# Patient Record
Sex: FEMALE | Race: AMERICAN INDIAN OR ALASKA NATIVE
[De-identification: names, ages, dates, MRNs, and addresses within clinical notes are randomized per-mention and may not be internally consistent; named-entity substitution may affect disease eponyms.]

---

## 2021-06-20 ENCOUNTER — HOSPITAL ENCOUNTER (EMERGENCY)
Dept: HOSPITAL 5 - ED | Age: 81
Discharge: HOME | End: 2021-06-20
Payer: MEDICAID

## 2021-06-20 VITALS — DIASTOLIC BLOOD PRESSURE: 81 MMHG | SYSTOLIC BLOOD PRESSURE: 148 MMHG

## 2021-06-20 DIAGNOSIS — Z91.041: ICD-10-CM

## 2021-06-20 DIAGNOSIS — Z86.73: ICD-10-CM

## 2021-06-20 DIAGNOSIS — E11.9: ICD-10-CM

## 2021-06-20 DIAGNOSIS — I10: ICD-10-CM

## 2021-06-20 DIAGNOSIS — R51.9: Primary | ICD-10-CM

## 2021-06-20 DIAGNOSIS — Z79.899: ICD-10-CM

## 2021-06-20 DIAGNOSIS — Z88.0: ICD-10-CM

## 2021-06-20 LAB
APTT BLD: 41.1 SEC. (ref 24.2–36.6)
BASOPHILS # (AUTO): 0.1 K/MM3 (ref 0–0.1)
BUN SERPL-MCNC: 19 MG/DL (ref 7–17)
BUN/CREAT SERPL: 17 %
CALCIUM SERPL-MCNC: 9.6 MG/DL (ref 8.4–10.2)
EOSINOPHIL # BLD AUTO: 0.2 K/MM3 (ref 0–0.4)
EOSINOPHIL NFR BLD AUTO: 2.2 % (ref 0–4.3)
HCT VFR BLD CALC: 43.9 % (ref 30.3–42.9)
HEMOLYSIS INDEX: 5
HGB BLD-MCNC: 15 GM/DL (ref 10.1–14.3)
INR PPP: 1.05 (ref 0.87–1.13)
LYMPHOCYTES # BLD AUTO: 2 K/MM3 (ref 1.2–5.4)
LYMPHOCYTES NFR BLD AUTO: 26.7 % (ref 13.4–35)
MCHC RBC AUTO-ENTMCNC: 34 % (ref 30–34)
MCV RBC AUTO: 92 FL (ref 79–97)
MONOCYTES # (AUTO): 0.8 K/MM3 (ref 0–0.8)
MONOCYTES % (AUTO): 10.6 % (ref 0–7.3)
PLATELET # BLD: 186 K/MM3 (ref 140–440)
RBC # BLD AUTO: 4.75 M/MM3 (ref 3.65–5.03)

## 2021-06-20 PROCEDURE — 85670 THROMBIN TIME PLASMA: CPT

## 2021-06-20 PROCEDURE — 36415 COLL VENOUS BLD VENIPUNCTURE: CPT

## 2021-06-20 PROCEDURE — 70450 CT HEAD/BRAIN W/O DYE: CPT

## 2021-06-20 PROCEDURE — 85025 COMPLETE CBC W/AUTO DIFF WBC: CPT

## 2021-06-20 PROCEDURE — 85610 PROTHROMBIN TIME: CPT

## 2021-06-20 PROCEDURE — 85730 THROMBOPLASTIN TIME PARTIAL: CPT

## 2021-06-20 PROCEDURE — 80048 BASIC METABOLIC PNL TOTAL CA: CPT

## 2021-06-20 PROCEDURE — 93005 ELECTROCARDIOGRAM TRACING: CPT

## 2021-06-20 PROCEDURE — 84484 ASSAY OF TROPONIN QUANT: CPT

## 2021-06-20 NOTE — CAT SCAN REPORT
NONENHANCED CT SCAN OF THE HEAD: 



INDICATION / CLINICAL INFORMATION:

81 years Female; neuro deficits <6hrs or sx present upon awakening.



TECHNIQUE: Routine CT head without contrast. All CT scans at this location are performed using CT dos
e reduction for ALARA by means of automated exposure control. 



COMPARISON: 

None.



FINDINGS:



BRAIN / INTRACRANIAL CONTENTS: No acute hemorrhage, mass effect, midline shift, hydrocephalus, or acu
te, large territorial infarct. Subtle ischemic changes in the right corpus stratum; age indeterminate
 No significant white matter abnormality. 



CRANIOCERVICAL JUNCTION: No significant abnormality.



ORBITS: No significant abnormality of visualized orbits.



SINUSES / MASTOIDS: No significant abnormality of the visualized paranasal sinuses or mastoid air aleena
ls.



ADDITIONAL FINDINGS: None. 



IMPRESSION:

 No intracerebral hemorrhage or stroke mimics



No acute territorial infarction; focal ischemic changes in the right corpus stratum; age indeterminat
e



Signer Name: Imelda Workman MD 

Signed: 6/20/2021 5:27 PM

Workstation Name: [id number]

## 2021-06-20 NOTE — EMERGENCY DEPARTMENT REPORT
HPI





- General


Chief Complaint: Neuro Symptoms/Deficit


Time Seen by Provider: 21 18:31





- [facility]


HPI: 





Room 7








The patient is an 81-year-old female present with a chief complaint of headache.

 Family states the patient has been complaining of intermittent migrating 

headache since yesterday.  There is also noticed redness to the left eye.  Has 

been no nausea vomiting, fever or preceding trauma.  Patient denies paresthesia 

or weakness.  Patient currently gets her headache a score of 8/10.





ED Past Medical Hx





- Past Medical History


Hx Hypertension: Yes


Hx CVA: Yes (TIA x2)


Hx Diabetes: Yes





- Surgical History


Past Surgical History?: No





- Family History


Family history: no significant





- Social History


Smoking Status: Never Smoker


Substance Use Type: None





- Medications


Home Medications: 


                                Home Medications











 Medication  Instructions  Recorded  Confirmed  Last Taken  Type


 


HYDROcodone/APAP 5-325 [[place] 1 each PO Q6HR PRN #10 tablet 21  Unknown Rx





5/325]     














ED Review of Systems


ROS: 


Stated complaint: HEADACHE


Other details as noted in HPI





Constitutional: denies: fever


Eyes: other (Subconjunctival hemorrhage OS)


ENT: denies: ear pain


Respiratory: no symptoms reported


Cardiovascular: denies: chest pain


Endocrine: no symptoms reported


Gastrointestinal: denies: abdominal pain


Genitourinary: denies: dysuria


Musculoskeletal: denies: back pain


Neurological: headache.  denies: weakness, numbness, paresthesias





Physical Exam





- Physical Exam


Vital Signs: 


                                   Vital Signs











  21





  15:50


 


Temperature 98.7 F


 


Pulse Rate 76


 


Respiratory 20





Rate 


 


Blood Pressure 148/81


 


O2 Sat by Pulse 96





Oximetry 











Physical Exam: 





GENERAL: The patient is well-developed well-nourished female lying on stretcher 

not appearing to be in acute distress. []


HEENT: Normocephalic.  Atraumatic.  Extraocular motions are intact.  Patient has

 moist mucous membranes.  Subconjunctival hemorrhage OS.  No hyphema


NECK: Supple.  No meningitic signs are noted.  There is no nuchal rigidity


CHEST/LUNGS: Clear to auscultation.  There is no respiratory distress noted.


HEART/CARDIOVASCULAR: Regular.  There is no tachycardia.  There is no gallop rub

 or murmur.


ABDOMEN: Abdomen is soft, nontender.  Patient has normal bowel sounds.  There is

 no abdominal distention.


SKIN: There is no rash.  There is no edema.  There is no diaphoresis.


NEURO: The patient is awake, alert, and oriented.  The patient is cooperative.  

The patient has no focal neurologic deficits.  The patient has normal speech and

 gait.  Cranial nerves II through XII grossly intact, no drift.  GCS 15.  Negat

faustino Romberg


MUSCULOSKELETAL:  There is no evidence of acute injury.





ED Course


                                   Vital Signs











  21





  15:50


 


Temperature 98.7 F


 


Pulse Rate 76


 


Respiratory 20





Rate 


 


Blood Pressure 148/81


 


O2 Sat by Pulse 96





Oximetry 














ED Medical Decision Making





- Lab Data


Result diagrams: 


                                 21 16:14





                                 21 16:14





                                Laboratory Tests











  21





  16:14 16:14 16:14


 


WBC  7.5  


 


RBC  4.75  


 


Hgb  15.0 H  


 


Hct  43.9 H  


 


MCV  92  


 


MCH  32  


 


MCHC  34  


 


RDW  13.1 L  


 


Plt Count  186  


 


Lymph % (Auto)  26.7  


 


Mono % (Auto)  10.6 H  


 


Eos % (Auto)  2.2  


 


Baso % (Auto)  Np  


 


Lymph # (Auto)  2.0  


 


Mono # (Auto)  0.8  


 


Eos # (Auto)  0.2  


 


Baso # (Auto)  0.1  


 


Seg Neutrophils %  59.8  


 


Seg Neutrophils #  4.5  


 


PT   14.2 


 


INR   1.05 


 


APTT   41.1 H 


 


Thrombin Time   


 


Sodium    144


 


Potassium    3.8


 


Chloride    102.3


 


Carbon Dioxide    31 H


 


Anion Gap    15


 


BUN    19 H


 


Creatinine    1.1


 


Estimated GFR    58


 


BUN/Creatinine Ratio    17


 


Glucose    90


 


Calcium    9.6


 


Troponin T    < 0.010














  21





  16:14


 


WBC 


 


RBC 


 


Hgb 


 


Hct 


 


MCV 


 


MCH 


 


MCHC 


 


RDW 


 


Plt Count 


 


Lymph % (Auto) 


 


Mono % (Auto) 


 


Eos % (Auto) 


 


Baso % (Auto) 


 


Lymph # (Auto) 


 


Mono # (Auto) 


 


Eos # (Auto) 


 


Baso # (Auto) 


 


Seg Neutrophils % 


 


Seg Neutrophils # 


 


PT 


 


INR 


 


APTT 


 


Thrombin Time  16.0


 


Sodium 


 


Potassium 


 


Chloride 


 


Carbon Dioxide 


 


Anion Gap 


 


BUN 


 


Creatinine 


 


Estimated GFR 


 


BUN/Creatinine Ratio 


 


Glucose 


 


Calcium 


 


Troponin T 














- EKG Data


-: EKG Interpreted by Me


EKG shows normal: sinus rhythm


Rate: normal





- EKG Data


When compared to previous EKG there are: previous EKG unavailable


Interpretation: nonspecific ST-T wave hilary (T wave inversions in leads V5, V6)





- Radiology Data


Radiology results: report reviewed (CT head), image reviewed (CT head)





[facility] [address] [place] [address] Cat

 Scan Report Signed Patient: ODILON MEJIA MR#: [id number] : 

1940 Acct:[id number] Age/Sex: 81 / F ADM Date: 21 Loc: ED 

Attending Dr: Ordering Physician: AMANDA ANDREWS MD Date of Service: 21 

Procedure(s): CT head/brain wo con Accession Number(s): [id number] cc: AMANDA ANDREWS MD 

NONENHANCED CT SCAN OF THE HEAD: INDICATION / CLINICAL INFORMATION: 81 years 

Female; neuro deficits <6hrs or sx present upon awakening. TECHNIQUE: Routine CT

 head without contrast. All CT scans at this location are performed using CT 

dose reduction for ALARA by means of automated exposure control. COMPARISON: 

None. FINDINGS: BRAIN / INTRACRANIAL CONTENTS: No acute hemorrhage, mass effect,

 midline shift, hydrocephalus, or acute, large territorial infarct. Subtle 

ischemic changes in the right corpus stratum; age indeterminate No significant 

white matter abnormality. CRANIOCERVICAL JUNCTION: No significant abnormality. 

ORBITS: No significant abnormality of visualized orbits. SINUSES / MASTOIDS: No 

significant abnormality of the visualized paranasal sinuses or mastoid air 

cells. ADDITIONAL FINDINGS: None. IMPRESSION: No intracerebral hemorrhage or 

stroke mimics No acute territorial infarction; focal ischemic changes in the 

right corpus stratum; age indeterminate Signer Name: Imelda Workman MD Signed: 2021 5:27 PM Workstation Name: [id number] Transcribed By: BS 

Dictated By: Imelda Serra MD Electronically Authenticated By: 

Imelda Serra MD Signed Date/Time: 21 DD/DT: 21 

TD/TT: 


Print Cancel 











- Differential Diagnosis


Headache, ICH, intracranial mass, migraine


Critical care attestation.: 


If time is entered above; I have spent that time in minutes in the direct care 

of this critically ill patient, excluding procedure time.








ED Disposition


Clinical Impression: 


 Headache





Disposition: DC-01 TO HOME OR SELFCARE


Is pt being admited?: No


Does the pt Need Aspirin: No


Condition: Stable


Instructions:  General Headache Without Cause, Easy-to-Read


Additional Instructions: 


Return to the emergency department should you develop worsening symptoms, 

inability to tolerate food or liquids, high fever or any other concerns


Prescriptions: 


HYDROcodone/APAP 5-325 [[place] 5/325] 1 each PO Q6HR PRN #10 tablet


 PRN Reason: Pain


Referrals: 


YIN PRUITT MD [Staff Physician] - 3-5 Days (Dr. Pruitt is a neurologist.  Please 

follow-up with him for further evaluation)


Time of Disposition: 19:00

## 2021-06-24 NOTE — ELECTROCARDIOGRAPH REPORT
[facility]

                                       

Test Date:    2021               Test Time:    16:09:58

Pat Name:     ODILON MEJIA      Department:   

Patient ID:   [id number]          Room:          

Gender:       F                        Technician:   

:          1940               Requested By: JACEK CORLEY

Order Number: [id number]              Reading MD:   Josse Brennan

                                 Measurements

Intervals                              Axis          

Rate:         74                       P:            78

FL:           166                      QRS:          -10

QRSD:         82                       T:            101

QT:           391                                    

QTc:          434                                    

                           Interpretive Statements

Sinus rhythm

Nonspecific T abnormalities, lateral leads

No previous ECG available for comparison

Electronically Signed On 2021 9:40:53 EDT by Josse Brennan

## 2024-06-07 ENCOUNTER — OFFICE VISIT (OUTPATIENT)
Dept: URBAN - METROPOLITAN AREA CLINIC 118 | Facility: CLINIC | Age: 84
End: 2024-06-07